# Patient Record
Sex: MALE | Race: WHITE | NOT HISPANIC OR LATINO | Employment: FULL TIME | URBAN - METROPOLITAN AREA
[De-identification: names, ages, dates, MRNs, and addresses within clinical notes are randomized per-mention and may not be internally consistent; named-entity substitution may affect disease eponyms.]

---

## 2019-05-06 ENCOUNTER — THERAPY VISIT (OUTPATIENT)
Dept: CHIROPRACTIC MEDICINE | Facility: CLINIC | Age: 59
End: 2019-05-06
Payer: COMMERCIAL

## 2019-05-06 DIAGNOSIS — M76.61 ACHILLES TENDINITIS OF RIGHT LOWER EXTREMITY: ICD-10-CM

## 2019-05-06 DIAGNOSIS — G89.29 CHRONIC PAIN OF RIGHT KNEE: ICD-10-CM

## 2019-05-06 DIAGNOSIS — M99.05 SOMATIC DYSFUNCTION OF PELVIS REGION: Primary | ICD-10-CM

## 2019-05-06 DIAGNOSIS — M25.561 CHRONIC PAIN OF RIGHT KNEE: ICD-10-CM

## 2019-05-06 DIAGNOSIS — M79.10 MYALGIA: ICD-10-CM

## 2019-05-06 PROCEDURE — 99202 OFFICE O/P NEW SF 15 MIN: CPT | Performed by: CHIROPRACTOR

## 2019-05-07 NOTE — PROGRESS NOTES
[facility]  May 6, 2019  L IT band last year, 6 months,   R Achilles,   Hamstring stretch B, piriformis stretch B, doing eccentric heel drop as regular routine   Elliptical 45 mintutes 5 days a week, lift weights upper body  Goal to play tennis  Notes put 25 pounds on last 1.5 years  No glute exercise  B medial knee pain, pain down stairs on R side and crepitus      Subjective:  Osiel Hayes   58 year old   male    CC: R achilles, B medial knee pain   Medications reviewed: Denies  Visit: 1/6  Goal: down stairs with no pain, play tennis, stand for 30 minutes without heel pain   Location: R medial knee, R achilles   EULOGIO: gradual onset but had IT band pain in past and start after that over last year   Pain: varies but 3/10, denies any swelling  Previous History: IT band pain on L  Progression: not changing   Quality: ache and tightness   Radiation: deniess  Pain is worse with: standing for long periods, steps  Pain is better with: stretching, activity   Timing: frequent pain   Under care: has not worked with anyone for this  Imaging: denies  Social: alert, oriented, and active. Enjoys walking      Objective:  Inspection:  Mild thickening of R achilles  No Scars  Normal Gait    Palpation:  No specific pain  Palpable soreness over B medial knees, R achilles   Myofascitis 2/4 noted over B adductors, B distal quad, R calf, R achilles     ROM:  Lumbar flexion   90/90  Lumbar extension  30/30  Right Hip IR  28/45  Left Hip IR  18/45  Right Hip ER  45/60  Left  hip ER  47/60  Ankle DF slightly limited on R with discomfort posterior heel  Knee flexion full with crepitus B more on R    MMT:  Left glute med 4/5 with no pain  Right glute med 4/5 with no pain  Left TFL 5/5 with no pain  Right TFL 5/5 with no pain  Left piriformis 5/5 with no pain  Right piriformis 5/5 with no pain  Knee extension 4/5 B with anterior knee pain on R  Heel raise 5/5 with mild discomfort on R    MET:  Right short leg  Right  superior pubic bone  Right hip out flare    Squat:  Shift to L  Foot flare to right  B knee genu valgum  R foot collapse      Lunge:  Right knee genu valgum  Right knee cross over     NAL:  Restricted SI on right side  Restricted Talus B    Neuro:  Able to toe walk and heel walk  L4-S1 light touch WNL    Ortho:  SLR (-), sydney (-), bounce home (-)    Assessment:  NAL with associated myofascitis and weakness  Achilles pain / tendinitis  Knee pain / patellar femoral     Plan:   Decrease pain 50%    Restore symmetric hip IR   Restore symmetric hip ER   Strengthen hip stabilizers to 5/5 B   Restore pelvic leveling   Restore segmental motion   Functional goals in history    Patient was given detailed history, review of symptoms, examination, functional examination, and report of findings. After this patient was treated with chiropractic adjustments, manual therapy, and therapeutic exercise. Patient tolerated treatment well. Patients treatment plan with be 2 times per week for 2 weeks followed by 1 time per week for 2 weeks. Following treatment plan a follow up exam will be done to make sure patient is improving. Treatment frequency will degrease as patients subjective complaints improve as well as objective findings. Prognosis for care is good based on fact that patient is active and is willing to take active approach in care.     Patient tolerated treatment well today  Treatment Time: 45 minutes  93189 manipulation 1-2 segments: MET, Hip LAD, Manual adjustment   06041 manipulation: Manual extremity  65792 Manual therapy: (ART, Graston, Strain Counter Strain, Fascial Manipulation, Cupping) performed over area of B adductors, B distal quads, R hip ER's, R calf, R achilles   11808 therapeutic exercise (30 minutes): clam shells green band, lateral band walk, eccentric heel drops  Strapping: hip lateral glide and IR  Shock wave: 10/6 over right achilles   Next visit: 1 week  Other:      Forest Izquierdo DC, MEd, ATC

## 2019-05-09 PROBLEM — M99.05 SOMATIC DYSFUNCTION OF PELVIS REGION: Status: ACTIVE | Noted: 2019-05-09

## 2019-05-09 PROBLEM — M25.561 CHRONIC PAIN OF RIGHT KNEE: Status: ACTIVE | Noted: 2019-05-09

## 2019-05-09 PROBLEM — M79.10 MYALGIA: Status: ACTIVE | Noted: 2019-05-09

## 2019-05-09 PROBLEM — M76.61 ACHILLES TENDINITIS OF RIGHT LOWER EXTREMITY: Status: ACTIVE | Noted: 2019-05-09

## 2019-05-09 PROBLEM — G89.29 CHRONIC PAIN OF RIGHT KNEE: Status: ACTIVE | Noted: 2019-05-09

## 2019-05-23 ENCOUNTER — THERAPY VISIT (OUTPATIENT)
Dept: CHIROPRACTIC MEDICINE | Facility: CLINIC | Age: 59
End: 2019-05-23
Payer: COMMERCIAL

## 2019-05-23 DIAGNOSIS — M99.05 SOMATIC DYSFUNCTION OF PELVIS REGION: Primary | ICD-10-CM

## 2019-05-23 DIAGNOSIS — M25.561 CHRONIC PAIN OF RIGHT KNEE: ICD-10-CM

## 2019-05-23 DIAGNOSIS — M76.61 ACHILLES TENDINITIS OF RIGHT LOWER EXTREMITY: ICD-10-CM

## 2019-05-23 DIAGNOSIS — G89.29 CHRONIC PAIN OF RIGHT KNEE: ICD-10-CM

## 2019-05-23 DIAGNOSIS — M79.10 MYALGIA: ICD-10-CM

## 2019-05-23 PROCEDURE — 97110 THERAPEUTIC EXERCISES: CPT | Performed by: CHIROPRACTOR

## 2019-05-23 PROCEDURE — 98940 CHIROPRACT MANJ 1-2 REGIONS: CPT | Mod: AT | Performed by: CHIROPRACTOR

## 2019-05-23 NOTE — PROGRESS NOTES
[facility]  May 6, 2019      Subjective:  Osiel Hayes   58 year old   male    CC: R achilles, B medial knee pain   Medications reviewed: Denies  Visit: 2/6  Goal: down stairs with no pain, play tennis, stand for 30 minutes without heel pain   Location: R medial knee, R achilles   EULOGIO: gradual onset but had IT band pain in past and start after that over last year   Pain: varies but 3/10, denies any swelling  Previous History: IT band pain on L  Progression: not changing   Quality: ache and tightness   Radiation: deniess  Pain is worse with: standing for long periods, steps  Pain is better with: stretching, activity   Timing: frequent pain   Under care: has not worked with anyone for this  Imaging: denies  Social: alert, oriented, and active. Enjoys walking  Tolerated exam well. Was not sore. Active care is going well. Denies any new issues. Notes no change in symptoms.       Objective:  Inspection:  Mild thickening of R achilles  No Scars  Normal Gait    Palpation:  No specific pain  Palpable soreness over B medial knees, R achilles   Myofascitis 2/4 noted over B adductors, B distal quad, R calf, R achilles     ROM:  Lumbar flexion   90/90  Lumbar extension  30/30  Right Hip IR  28/45  Left Hip IR  18/45  Right Hip ER  45/60  Left  hip ER  47/60  Ankle DF slightly limited on R with discomfort posterior heel  Knee flexion full with crepitus B more on R    MMT:  Left glute med 4/5 with no pain  Right glute med 4/5 with no pain  Left TFL 5/5 with no pain  Right TFL 5/5 with no pain  Left piriformis 5/5 with no pain  Right piriformis 5/5 with no pain  Knee extension 4/5 B with anterior knee pain on R  Heel raise 5/5 with mild discomfort on R    MET:  Right short leg  Right superior pubic bone  Right hip out flare    Squat:  Shift to L  Foot flare to right  B knee genu valgum  R foot collapse      Lunge:  Right knee genu valgum  Right knee cross over     NAL:  Restricted SI on right side  Restricted  Talus B    Ortho:  SLR (-), sydney (-), bounce home (-)    Assessment:  NAL with associated myofascitis and weakness  Achilles pain / tendinitis  Knee pain / patellar femoral     Plan:  Patient tolerated treatment well today  Treatment Time: 45 minutes  84552 manipulation 1-2 segments: MET, Hip LAD  81892 manipulation: Manual extremity  96019 Manual therapy: (ART, Graston, Strain Counter Strain, Fascial Manipulation, Cupping) performed over area of B adductors, B distal quads, R hip ER's, R calf, R achilles   36694 therapeutic exercise (30 minutes): clam shells green band, lateral band walk, eccentric heel drops, calf loading on leg press, knee drive push off, single leg box drop   Strapping: hip lateral glide and IR  Shock wave: 15/6 over right achilles   Next visit: 1 week  Other:      Forest Izquierdo DC, MEd, ATC

## 2019-06-06 ENCOUNTER — THERAPY VISIT (OUTPATIENT)
Dept: CHIROPRACTIC MEDICINE | Facility: CLINIC | Age: 59
End: 2019-06-06
Payer: COMMERCIAL

## 2019-06-06 DIAGNOSIS — M25.561 CHRONIC PAIN OF RIGHT KNEE: ICD-10-CM

## 2019-06-06 DIAGNOSIS — G89.29 CHRONIC PAIN OF RIGHT KNEE: ICD-10-CM

## 2019-06-06 DIAGNOSIS — M79.10 MYALGIA: ICD-10-CM

## 2019-06-06 DIAGNOSIS — M76.61 ACHILLES TENDINITIS OF RIGHT LOWER EXTREMITY: ICD-10-CM

## 2019-06-06 DIAGNOSIS — M99.05 SOMATIC DYSFUNCTION OF PELVIS REGION: Primary | ICD-10-CM

## 2019-06-06 PROCEDURE — 98940 CHIROPRACT MANJ 1-2 REGIONS: CPT | Mod: AT | Performed by: CHIROPRACTOR

## 2019-06-06 PROCEDURE — 97110 THERAPEUTIC EXERCISES: CPT | Performed by: CHIROPRACTOR

## 2019-06-07 NOTE — PROGRESS NOTES
[facility]  May 6, 2019      Subjective:  Osiel Hayes   58 year old   male    CC: R achilles, B medial knee pain   Medications reviewed: Denies  Visit: 3/6  Goal: down stairs with no pain, play tennis, stand for 30 minutes without heel pain   Location: R medial knee, R achilles   EULOGIO: gradual onset but had IT band pain in past and start after that over last year   Pain: varies but 3/10, denies any swelling  Previous History: IT band pain on L  Progression: not changing   Quality: ache and tightness   Radiation: deniess  Pain is worse with: standing for long periods, steps  Pain is better with: stretching, activity   Timing: frequent pain   Under care: has not worked with anyone for this  Imaging: denies  Social: alert, oriented, and active. Enjoys walking  Comes in today doing very well. Notes he is improving. Less pain. Notes still rubbing on shoes and we talked about getting shoes that don't do that. He agreed. Active care is going very well. Denies any new issues. Most pain over right distal achilles.     Objective:  Inspection:  Mild thickening of R achilles  No Scars  Normal Gait    Palpation:  No specific pain  Palpable soreness over B medial knees, R achilles   Myofascitis 2/4 noted over B adductors, B distal quad, R calf, R achilles     ROM:  Lumbar flexion   90/90  Lumbar extension  30/30  Right Hip IR  28/45  Left Hip IR  18/45  Right Hip ER  45/60  Left  hip ER  47/60  Ankle DF slightly limited on R with discomfort posterior heel  Knee flexion full with crepitus B more on R    MMT:  Left glute med 4/5 with no pain  Right glute med 4/5 with no pain  Left TFL 5/5 with no pain  Right TFL 5/5 with no pain  Left piriformis 5/5 with no pain  Right piriformis 5/5 with no pain  Knee extension 4/5 B with anterior knee pain on R  Heel raise 5/5 with mild discomfort on R    MET:  Right short leg  Right superior pubic bone  Right hip out flare    Squat:  Shift to L  Foot flare to right  B knee  genu valgum  R foot collapse      Lunge:  Right knee genu valgum  Right knee cross over     NAL:  Restricted SI on right side  Restricted Talus B    Ortho:  SLR (-), sydney (-), bounce home (-)    Assessment:  NAL with associated myofascitis and weakness  Achilles pain / tendinitis  Knee pain / patellar femoral     Plan:  Patient tolerated treatment well today  Treatment Time: 45 minutes  42092 manipulation 1-2 segments: MET, Hip LAD  70370 manipulation: Manual extremity  16821 Manual therapy: (ART, Graston, Strain Counter Strain, Fascial Manipulation, Cupping) performed over area of B adductors, B distal quads, R hip ER's, R calf, R achilles   55360 therapeutic exercise (30 minutes): clam shells green band, lateral band walk, eccentric heel drops, calf loading on leg press, knee drive push off, single leg box drop   Strapping: hip lateral glide and IR  Shock wave: 15/7 over right achilles   Next visit: 2 week  Other:      Forest Izquierdo DC, MEd, ATC

## 2019-06-20 ENCOUNTER — THERAPY VISIT (OUTPATIENT)
Dept: CHIROPRACTIC MEDICINE | Facility: CLINIC | Age: 59
End: 2019-06-20
Payer: COMMERCIAL

## 2019-06-20 DIAGNOSIS — M99.05 SOMATIC DYSFUNCTION OF PELVIS REGION: Primary | ICD-10-CM

## 2019-06-20 DIAGNOSIS — M79.10 MYALGIA: ICD-10-CM

## 2019-06-20 DIAGNOSIS — M25.561 CHRONIC PAIN OF RIGHT KNEE: ICD-10-CM

## 2019-06-20 DIAGNOSIS — G89.29 CHRONIC PAIN OF RIGHT KNEE: ICD-10-CM

## 2019-06-20 DIAGNOSIS — M76.61 ACHILLES TENDINITIS OF RIGHT LOWER EXTREMITY: ICD-10-CM

## 2019-06-20 PROCEDURE — 98940 CHIROPRACT MANJ 1-2 REGIONS: CPT | Mod: AT | Performed by: CHIROPRACTOR

## 2019-06-20 NOTE — PROGRESS NOTES
[facility]  May 6, 2019      Subjective:  Osiel Hayes   58 year old   male    CC: R achilles, B medial knee pain   Medications reviewed: Denies  Visit: 4/6  Goal: down stairs with no pain, play tennis, stand for 30 minutes without heel pain   Location: R medial knee, R achilles   EULOGIO: gradual onset but had IT band pain in past and start after that over last year   Pain: varies but 3/10, denies any swelling  Previous History: IT band pain on L  Progression: not changing   Quality: ache and tightness   Radiation: deniess  Pain is worse with: standing for long periods, steps  Pain is better with: stretching, activity   Timing: frequent pain   Under care: has not worked with anyone for this  Imaging: denies  Social: alert, oriented, and active. Enjoys walking  Comes in today doing very well. Notes he is improving. Less pain. He notes most pain is just from rubbing on shoes and is working on getting new pair. His knees are better with some mild soreness over medial aspect. Active care is going very well. Denies any new issues. Pleased with progress.     Objective:  Inspection:  Mild thickening of R achilles  No Scars  Normal Gait    Palpation:  No specific pain  Palpable soreness over B medial knees, R achilles   Myofascitis 2/4 noted over B adductors, B distal quad, R calf, R achilles     ROM:  Lumbar flexion   90/90  Lumbar extension  30/30  Right Hip IR  28/45  Left Hip IR  18/45  Right Hip ER  45/60  Left  hip ER  47/60  Ankle DF slightly limited on R with discomfort posterior heel  Knee flexion full with crepitus B more on R    MMT:  Left glute med 4/5 with no pain  Right glute med 4/5 with no pain  Left TFL 5/5 with no pain  Right TFL 5/5 with no pain  Left piriformis 5/5 with no pain  Right piriformis 5/5 with no pain  Knee extension 4/5 B with anterior knee pain on R  Heel raise 5/5 with mild discomfort on R    MET:  Right short leg  Right superior pubic bone  Right hip out  flare    Squat:  Shift to L  Foot flare to right  B knee genu valgum  R foot collapse      Lunge:  Right knee genu valgum  Right knee cross over     NAL:  Restricted SI on right side  Restricted Talus B    Ortho:  SLR (-), sydney (-), bounce home (-)    Assessment:  NAL with associated myofascitis and weakness  Achilles pain / tendinitis  Knee pain / patellar femoral     Plan:  Patient tolerated treatment well today  Treatment Time: 45 minutes  82208 manipulation 1-2 segments: MET, Hip LAD  17037 manipulation: Manual extremity  32751 Manual therapy: (ART, Graston, Strain Counter Strain, Fascial Manipulation, Cupping) performed over area of B adductors, B distal quads, R hip ER's, R calf, R achilles   01970 therapeutic exercise (30 minutes): clam shells green band, lateral band walk, eccentric heel drops, calf loading on leg press, knee drive push off, single leg box drop  Slider lunges with plate, wall squats with band around knees, review of leg press  Strapping: hip lateral glide and IR  Wall squats, slider lunges  Shock wave: 15/7 over right achilles (did not do)  Next visit: PRN as doing well on own  Other:      Forest Izquierdo DC, MEd, ATC

## 2024-06-03 ENCOUNTER — TRANSFERRED RECORDS (OUTPATIENT)
Dept: HEALTH INFORMATION MANAGEMENT | Facility: CLINIC | Age: 64
End: 2024-06-03

## 2024-07-10 ENCOUNTER — TRANSCRIBE ORDERS (OUTPATIENT)
Dept: OTHER | Age: 64
End: 2024-07-10

## 2024-07-10 ENCOUNTER — TRANSFERRED RECORDS (OUTPATIENT)
Dept: HEALTH INFORMATION MANAGEMENT | Facility: CLINIC | Age: 64
End: 2024-07-10
Payer: COMMERCIAL

## 2024-07-10 ENCOUNTER — MEDICAL CORRESPONDENCE (OUTPATIENT)
Dept: HEALTH INFORMATION MANAGEMENT | Facility: CLINIC | Age: 64
End: 2024-07-10
Payer: COMMERCIAL

## 2024-07-10 DIAGNOSIS — N32.81 OVERACTIVE BLADDER: Primary | ICD-10-CM

## 2024-12-01 ENCOUNTER — HEALTH MAINTENANCE LETTER (OUTPATIENT)
Age: 64
End: 2024-12-01

## 2024-12-11 NOTE — TELEPHONE ENCOUNTER
Action 2024 Jtv 11:15 AM    Action Taken CSS sent an urgent request to [facility] for images.    MEDICAL RECORDS REQUEST   [facility]  Urology Clinic  [address] [place] [address]  PHONE: [phone]  Fax: [phone]        FUTURE VISIT INFORMATION                                                   Osiel Hayes, : 1960 scheduled for future visit at [facility]    APPOINTMENT INFORMATION:  Date: 2024  Provider:  Carlos Anderson MD  Reason for Visit/Diagnosis: overactive bladder    REFERRAL INFORMATION:  Referring provider:  Poncho Clark MD, 2 [facility] clinic      RECORDS REQUESTED FOR VISIT                                                     NOTES  STATUS/DETAILS   OFFICE NOTE from referring provider Media tab Yes, Poncho Clark MD, 2 [facility] clinic   OFFICE NOTE from other specialist Media tab YES, 2023, 10/26/2023 -- SHARLENE HENDRICKS MD @ [facility]   DISCHARGE REPORT from the ER  yes   MEDICATION LIST  yes   LABS     URINALYSIS (UA)  yes   IMAGES pending YES, [facility]  10/13/2022 -- CT ABD PELVIS    [facility]  10/26/2023 -- BLADDER SCAN  2022 -- US PROSTATE     PRE-VISIT CHECKLIST      Joint diagnostic appointment coordinated correctly          (ensure right order & amount of time) Yes   RECORD COLLECTION COMPLETE pending

## 2024-12-12 ENCOUNTER — PRE VISIT (OUTPATIENT)
Dept: UROLOGY | Facility: CLINIC | Age: 64
End: 2024-12-12
Payer: COMMERCIAL

## 2024-12-12 NOTE — TELEPHONE ENCOUNTER
"Reason for visit:   Patient listed as new OAB - MR from 10/13/22 notes \"Marked enlargement of the prostate gland.\"    Relevant information:   S/p [facility] w/ [facility] 2023  Return of LUTS - this is the reason for referral.    Records/imaging/labs/orders:       Pt called: No need for a call    At Rooming:   Have patient void in hat, PVR    RN NOTIFIED 12/12/2024 that this patient may not be appropriately scheduled.     Morena Win LPN  12/12/2024  3:46 PM  "

## 2024-12-23 ENCOUNTER — PRE VISIT (OUTPATIENT)
Dept: UROLOGY | Facility: CLINIC | Age: 64
End: 2024-12-23

## 2024-12-23 ENCOUNTER — OFFICE VISIT (OUTPATIENT)
Dept: UROLOGY | Facility: CLINIC | Age: 64
End: 2024-12-23
Attending: FAMILY MEDICINE
Payer: COMMERCIAL

## 2024-12-23 VITALS
SYSTOLIC BLOOD PRESSURE: 145 MMHG | WEIGHT: 186 LBS | BODY MASS INDEX: 26.04 KG/M2 | HEIGHT: 71 IN | HEART RATE: 64 BPM | DIASTOLIC BLOOD PRESSURE: 78 MMHG | OXYGEN SATURATION: 100 %

## 2024-12-23 DIAGNOSIS — N32.81 OVERACTIVE BLADDER: ICD-10-CM

## 2024-12-23 DIAGNOSIS — R97.20 ELEVATED PROSTATE SPECIFIC ANTIGEN (PSA): Primary | ICD-10-CM

## 2024-12-23 RX ORDER — ATORVASTATIN CALCIUM 20 MG/1
1 TABLET, FILM COATED ORAL DAILY
COMMUNITY

## 2024-12-23 RX ORDER — TADALAFIL 5 MG/1
1 TABLET ORAL
COMMUNITY
Start: 2024-12-03

## 2024-12-23 RX ORDER — CHLORTHALIDONE 25 MG/1
1 TABLET ORAL DAILY
COMMUNITY

## 2024-12-23 RX ORDER — OXYBUTYNIN CHLORIDE 5 MG/1
5 TABLET ORAL 3 TIMES DAILY
Qty: 90 TABLET | Refills: 3 | Status: SHIPPED | OUTPATIENT
Start: 2024-12-23

## 2024-12-23 ASSESSMENT — PAIN SCALES - GENERAL: PAINLEVEL_OUTOF10: NO PAIN (0)

## 2024-12-23 NOTE — NURSING NOTE
"Osiel Hayes is a 64 year old male patient that presents today in clinic for the following:    Chief Complaint   Patient presents with    Consult     Overactive bladder        The patient's allergies and medications were reviewed as noted. A set of vitals were recorded as noted without incident. The patient does not have any other questions for the provider.    Blood pressure (!) 145/78, pulse 64, height 1.803 m (5' 11\"), weight 84.4 kg (186 lb), SpO2 100%. Body mass index is 25.94 kg/m .    Patient Active Problem List   Diagnosis    Somatic dysfunction of pelvis region    Chronic pain of right knee    Achilles tendinitis of right lower extremity    Myalgia       No Known Allergies    Current Outpatient Medications   Medication Sig Dispense Refill    tadalafil (CIALIS) 5 MG tablet Take 1 tablet by mouth daily at 2 pm.      atorvastatin (LIPITOR) 20 MG tablet Take 1 tablet by mouth daily.      chlorthalidone (HYGROTON) 25 MG tablet Take 1 tablet by mouth daily.         Social History     Tobacco Use    Smoking status: Never     Passive exposure: Past    Smokeless tobacco: Never       Rochelle Mcmanus LPN  12/23/2024  9:01 AM  "

## 2024-12-23 NOTE — PROGRESS NOTES
CC: LUTS    HPI: 63 yo male with 4-5 years of LUTS, including nocturia 4-5 times and urgency and frequency, and slow stream.  Had tried flomax and finasteride but these did not help adequately.  Also had a prostate infection treated with cipro.  Underwent a Rezume procedure in July 2023 with short-lived improvement.  Tried oxybutynin and meloxicam which did help some with nocturia but stopped taking it in April 2024.  Prostate was previously estimated to be about 60 ml.  Previous PVRs measured at 43 ml  last year. Here for another opinion.  Does drink 6-7 cups of coffee daily.  He notes that his LUTS are bothersome but don't greatly impact his QOL.      PMHx:HTN, LUTS, cholesterol  PSHx: Rezume  MEDS: chlorthalidone, atorvastatin, omeprazole  NKDA  FHx: no CaP  SocHx: neg tobac, occais ETOH  ROS: neg for f/c/s, n/v, wt changes    OBJECTIVE:    PSA 9/27/22 is 3.9 ng/mL  PSA 9/25/21 4.14 ng/mL    ASSESSMENT AND PLAN: Over half of today's 45-minute visit was spent reviewing the chart, results and counseling the patient regarding his LUTS.  Will  cut down on coffee first and then try oxybutynin if no improvement.  Also can consider pelvic floor relaxation in future but not likely the main  at this time.  Will also have his PSA rechecked in 6 months as it is hovering at the upper edge of normal.  Follow up in 6 months.

## 2025-04-22 ENCOUNTER — TELEPHONE (OUTPATIENT)
Dept: UROLOGY | Facility: CLINIC | Age: 65
End: 2025-04-22
Payer: COMMERCIAL